# Patient Record
Sex: FEMALE | Race: AMERICAN INDIAN OR ALASKA NATIVE
[De-identification: names, ages, dates, MRNs, and addresses within clinical notes are randomized per-mention and may not be internally consistent; named-entity substitution may affect disease eponyms.]

---

## 2018-02-05 ENCOUNTER — HOSPITAL ENCOUNTER (OUTPATIENT)
Dept: HOSPITAL 31 - C.SDS | Age: 63
Discharge: HOME | End: 2018-02-05
Attending: UROLOGY
Payer: MEDICARE

## 2018-02-05 VITALS — BODY MASS INDEX: 38.6 KG/M2

## 2018-02-05 VITALS
TEMPERATURE: 97.1 F | SYSTOLIC BLOOD PRESSURE: 117 MMHG | OXYGEN SATURATION: 100 % | DIASTOLIC BLOOD PRESSURE: 59 MMHG | HEART RATE: 77 BPM

## 2018-02-05 VITALS — RESPIRATION RATE: 18 BRPM

## 2018-02-05 DIAGNOSIS — N30.81: Primary | ICD-10-CM

## 2018-02-05 DIAGNOSIS — N81.89: ICD-10-CM

## 2018-02-05 DIAGNOSIS — E11.9: ICD-10-CM

## 2018-02-05 PROCEDURE — 74420 UROGRAPHY RTRGR +-KUB: CPT

## 2018-02-05 PROCEDURE — 52204 CYSTOSCOPY W/BIOPSY(S): CPT

## 2018-02-05 PROCEDURE — 87086 URINE CULTURE/COLONY COUNT: CPT

## 2018-02-05 PROCEDURE — 82948 REAGENT STRIP/BLOOD GLUCOSE: CPT

## 2018-02-05 PROCEDURE — 88104 CYTOPATH FL NONGYN SMEARS: CPT

## 2018-02-05 PROCEDURE — 88305 TISSUE EXAM BY PATHOLOGIST: CPT

## 2018-02-05 RX ADMIN — CEFTRIAXONE ONE MLS: 1 INJECTION, SOLUTION INTRAVENOUS at 08:20

## 2018-02-05 RX ADMIN — CEFTRIAXONE ONE MLS: 1 INJECTION, SOLUTION INTRAVENOUS at 07:35

## 2018-02-05 NOTE — PCM.[id number]
Surgeon's Initial Post Op Note





- Surgeon's Notes


Surgeon: Abdelrahman


Assistant: none


Type of Anesthesia: IV Sedation


Pre-Operative Diagnosis: Hematuria


Operative Findings: cystitis.  BN polyps


Post-Operative Diagnosis: same


Operation Performed: cysto, bilat rtg pyelogram.  bladder bx and fulg, BN bx 

and fulg.  eua


Specimen/Specimens Removed: urine, bladder bx


Estimated Blood Loss: EBL {In ML}: 0


Blood Products Given: N/A


Drains Used: No Drains


Post-Op Condition: Good


Date of Surgery/Procedure: 02/05/18


Time of Surgery/Procedure: 09:00

## 2018-02-06 NOTE — RAD
PROCEDURE:  Urography, bilateral retrograde



HISTORY:

HEMATURIA



COMPARISON:

None



TECHNIQUE:

Standard protocol for this study/examination.



FINDINGS:

Unremarkable collecting systems and ureters as visualized.



IMPRESSION:

No significant or acute  findings to account for/ related to the 

clinical presentation.

## 2018-02-07 NOTE — OP
PROCEDURE DATE:  02/05/2018



PREOPERATIVE DIAGNOSIS:  Hematuria.



POSTOPERATIVE DIAGNOSES:

1.  Hematuria.

2.  Cystitis.

3.  Pelvic prolapse.



PROCEDURE:

1.  Cystoscopy.

2.  Bilateral retrograde pyelogram.

3.  Bladder biopsies and fulguration.

4.  Exam under anesthesia.



OPERATING SURGEON:  Tania Alas MD



PROCEDURE:  As follows.  The patient received perioperative antibiotics. 

The patient was placed in a lithotomy position.  Genitalia prepped and

draped sterilely.  Sedation was provided by the anesthesiologist.



A 22-Slov[place] cystoscope was introduced with obturator.  Urethra and bladder

were inspected with 30-degree and 70-degree lenses.



FINDINGS:  There was normal caliber of urethra.  There was no bladder

tumor.  There was no bladder stone.  There was moderate bladder

trabeculation.  The ureteral orifices were normal in position and shape. 

There were multiple areas of abnormal mucosa of the bladder are consistent

with probable inflammatory changes.



Occlusive tip retrograde ureteral pyelogram was performed.  Iodinated

contrast dye was instilled via cone tip catheter into each ureteral

orifice.  The kidneys and ureters were viewed sequentially with

fluoroscopy.



Retrograde pyelogram demonstrated no evidence of obstruction or filling

defect.  Hard copy films were obtained as well as there was malfunction of

the fluoroscopy unit.



There was no evidence of obstruction or filling defect.  There was good

drainage noted on the post drainage film.



The areas of the abnormal mucosa were biopsied using cold cup biopsy

forceps.  Fulguration was performed with electrocautery.  These specimens

were labeled and sent individually.



The bladder was reinspected with 70-degree lens to confirm the above

findings.



Hemostasis was complete.



The bladder was then drained.  Cystoscope sheath removed.



There was no bladder tumor.  There was no bladder stone.



Exam under anesthesia was performed.  There was no abnormal pelvic mass

fixation or induration.  There is mild pelvic prolapse.  There was no

adnexal mass.



The patient was returned to supine position.



The patient tolerated the procedure without complication.





__________________________________________

Tania Alas MD



DD:  02/07/2018 8:04:20

DT:  02/07/2018 8:09:41

Job # [id number]

## 2018-02-08 NOTE — OP
PROCEDURE DATE:



NO DICTATION.





__________________________________________

Tania Alas MD



DD:  02/07/2018 8:00:44

DT:  02/07/2018 9:32:46

Job # [id number]